# Patient Record
Sex: MALE | Race: BLACK OR AFRICAN AMERICAN | NOT HISPANIC OR LATINO | Employment: FULL TIME | URBAN - METROPOLITAN AREA
[De-identification: names, ages, dates, MRNs, and addresses within clinical notes are randomized per-mention and may not be internally consistent; named-entity substitution may affect disease eponyms.]

---

## 2023-10-02 ENCOUNTER — HOSPITAL ENCOUNTER (EMERGENCY)
Facility: HOSPITAL | Age: 33
Discharge: HOME | End: 2023-10-02
Payer: COMMERCIAL

## 2023-10-02 ENCOUNTER — APPOINTMENT (OUTPATIENT)
Dept: RADIOLOGY | Facility: HOSPITAL | Age: 33
End: 2023-10-02
Payer: COMMERCIAL

## 2023-10-02 VITALS
OXYGEN SATURATION: 99 % | WEIGHT: 286 LBS | RESPIRATION RATE: 20 BRPM | BODY MASS INDEX: 40.04 KG/M2 | SYSTOLIC BLOOD PRESSURE: 172 MMHG | TEMPERATURE: 98.6 F | DIASTOLIC BLOOD PRESSURE: 105 MMHG | HEIGHT: 71 IN | HEART RATE: 64 BPM

## 2023-10-02 DIAGNOSIS — S43.101A AC SEPARATION, RIGHT, INITIAL ENCOUNTER: Primary | ICD-10-CM

## 2023-10-02 PROCEDURE — 73030 X-RAY EXAM OF SHOULDER: CPT | Mod: RT,FY,FR

## 2023-10-02 PROCEDURE — 99283 EMERGENCY DEPT VISIT LOW MDM: CPT

## 2023-10-02 PROCEDURE — 73030 X-RAY EXAM OF SHOULDER: CPT | Mod: RIGHT SIDE | Performed by: RADIOLOGY

## 2023-10-02 PROCEDURE — 2500000001 HC RX 250 WO HCPCS SELF ADMINISTERED DRUGS (ALT 637 FOR MEDICARE OP): Performed by: PHYSICIAN ASSISTANT

## 2023-10-02 RX ORDER — TRAMADOL HYDROCHLORIDE 50 MG/1
50 TABLET ORAL ONCE
Status: COMPLETED | OUTPATIENT
Start: 2023-10-02 | End: 2023-10-02

## 2023-10-02 RX ORDER — TRAMADOL HYDROCHLORIDE 50 MG/1
50 TABLET ORAL EVERY 6 HOURS PRN
Qty: 12 TABLET | Refills: 0 | Status: SHIPPED | OUTPATIENT
Start: 2023-10-02 | End: 2023-10-05

## 2023-10-02 RX ORDER — IBUPROFEN 600 MG/1
600 TABLET ORAL EVERY 6 HOURS PRN
Qty: 28 TABLET | Refills: 0 | Status: SHIPPED | OUTPATIENT
Start: 2023-10-02 | End: 2023-10-09

## 2023-10-02 RX ADMIN — TRAMADOL HYDROCHLORIDE 50 MG: 50 TABLET, COATED ORAL at 23:13

## 2023-10-02 ASSESSMENT — COLUMBIA-SUICIDE SEVERITY RATING SCALE - C-SSRS
6. HAVE YOU EVER DONE ANYTHING, STARTED TO DO ANYTHING, OR PREPARED TO DO ANYTHING TO END YOUR LIFE?: NO
1. IN THE PAST MONTH, HAVE YOU WISHED YOU WERE DEAD OR WISHED YOU COULD GO TO SLEEP AND NOT WAKE UP?: NO
2. HAVE YOU ACTUALLY HAD ANY THOUGHTS OF KILLING YOURSELF?: NO

## 2023-10-02 ASSESSMENT — PAIN DESCRIPTION - LOCATION: LOCATION: SHOULDER

## 2023-10-02 ASSESSMENT — PAIN DESCRIPTION - PAIN TYPE: TYPE: ACUTE PAIN

## 2023-10-02 ASSESSMENT — PAIN - FUNCTIONAL ASSESSMENT: PAIN_FUNCTIONAL_ASSESSMENT: 0-10

## 2023-10-02 ASSESSMENT — PAIN SCALES - GENERAL
PAINLEVEL_OUTOF10: 8
PAINLEVEL_OUTOF10: 10 - WORST POSSIBLE PAIN

## 2023-10-02 ASSESSMENT — PAIN DESCRIPTION - ORIENTATION: ORIENTATION: RIGHT

## 2023-10-03 NOTE — ED PROVIDER NOTES
HPI   Chief Complaint   Patient presents with    Shoulder Pain     Pt arrived from work. Pt was playing kickball, and injured his right shoulder. Pt has 10/10 pain and lowered mobility of the extremity.        33-year-old male presents with complaint of pain in his right shoulder he had a fall that happened and mechanically at work he denies injury elsewhere he feels elevation of his clavicle compared to where he would normally lie.  No chest pain or shortness of breath nothing makes them better or worse has not taken anything for the pain.                          No data recorded                Patient History   No past medical history on file.  No past surgical history on file.  No family history on file.  Social History     Tobacco Use    Smoking status: Not on file    Smokeless tobacco: Not on file   Substance Use Topics    Alcohol use: Not on file    Drug use: Not on file       Physical Exam   ED Triage Vitals [10/02/23 2116]   Temp Heart Rate Resp BP   37 °C (98.6 °F) 64 20 (!) 172/105      SpO2 Temp src Heart Rate Source Patient Position   99 % -- Monitor --      BP Location FiO2 (%)     Right arm --       Physical Exam  Vitals reviewed.   Constitutional:       General: He is not in acute distress.     Appearance: Normal appearance. He is normal weight. He is not ill-appearing, toxic-appearing or diaphoretic.   HENT:      Head: Normocephalic and atraumatic.      Right Ear: External ear normal.      Left Ear: External ear normal.      Nose: Nose normal.   Eyes:      Extraocular Movements: Extraocular movements intact.      Conjunctiva/sclera: Conjunctivae normal.      Pupils: Pupils are equal, round, and reactive to light.   Pulmonary:      Effort: Pulmonary effort is normal. No respiratory distress.      Breath sounds: No stridor.   Abdominal:      General: There is no distension.   Musculoskeletal:         General: No swelling or deformity.      Right shoulder: Tenderness present.        Arms:    Skin:      Capillary Refill: Capillary refill takes less than 2 seconds.      Findings: No rash.   Neurological:      General: No focal deficit present.      Mental Status: He is alert and oriented to person, place, and time. Mental status is at baseline.   Psychiatric:         Mood and Affect: Mood normal.         Behavior: Behavior normal.         Thought Content: Thought content normal.         Judgment: Judgment normal.         ED Course & MDM   Diagnoses as of 10/02/23 8919   AC separation, right, initial encounter       Medical Decision Making  MEDICAL DECISION MAKING   Number and Complexity of Problems  Differential Diagnosis: Fracture, dislocation, sprain.    MDM Data  Shoulder x-ray interpreted by me shows AC separation no signs of fracture dislocation    Treatment and Disposition  ED Course: Ordered sling and analgesia patient to follow-up with orthopedics return for worse concerning symptoms O[facility]RS reviewed tramadol prescribed as well as Motrin.          Procedure  Procedures     Guanako He PA-C  10/02/23 6585

## 2023-10-04 ENCOUNTER — OFFICE VISIT (OUTPATIENT)
Dept: ORTHOPEDIC SURGERY | Facility: CLINIC | Age: 33
End: 2023-10-04
Payer: COMMERCIAL

## 2023-10-04 VITALS — HEIGHT: 71 IN | BODY MASS INDEX: 40.04 KG/M2 | WEIGHT: 286 LBS

## 2023-10-04 DIAGNOSIS — S43.51XA ACROMIOCLAVICULAR SPRAIN, RIGHT, INITIAL ENCOUNTER: Primary | ICD-10-CM

## 2023-10-04 PROCEDURE — 99203 OFFICE O/P NEW LOW 30 MIN: CPT | Performed by: FAMILY MEDICINE

## 2023-10-04 PROCEDURE — 1036F TOBACCO NON-USER: CPT | Performed by: FAMILY MEDICINE

## 2023-10-04 NOTE — LETTER
October 4, 2023     Patient: Johnson Garner   YOB: 1990   Date of Visit: 10/4/2023       To Whom It May Concern:    It is my medical opinion that Johnson Garner should remain out of work until 10/27/23 .    If you have any questions or concerns, please don't hesitate to call.         Sincerely,        Alessio Foster MD    CC: No Recipients

## 2023-10-04 NOTE — PROGRESS NOTES
History of Present Illness   Chief Complaint   Patient presents with    Right Shoulder - Pain    Health system DOI: 10/2/23       The patient is 33 y.o. right-hand-dominant male presenting for evaluation of right shoulder injury.  This is a work-related injury, injury occurred 2 days ago, fell while playing kickball at work.  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  Fell landing on right shoulder with acute onset of pain, limited range of motion.  He was seen in the emergency department following injury, x-rays obtained which did reveal a grade 3 AC sprain, he is placed in a sling and recommended outpatient orthopedic follow-up.  Has been taking Motrin for pain control, minimal change in symptoms over the past 2 days.  Still with pretty limited range of motion.  Pain with attempts at moving his shoulder, difficulty sleeping at night secondary to pain.  He denies any radiation of pain down the upper extremity, no numbness or tingling.  He has noticed a more notable prominence of the distal clavicle not present previous to this.    No past medical history on file.    Medication Documentation Review Audit    **Prior to Admission medications have not yet been reviewed**         No Known Allergies    Social History     Socioeconomic History    Marital status:      Spouse name: Not on file    Number of children: Not on file    Years of education: Not on file    Highest education level: Not on file   Occupational History    Not on file   Tobacco Use    Smoking status: Not on file    Smokeless tobacco: Not on file   Substance and Sexual Activity    Alcohol use: Not on file    Drug use: Not on file    Sexual activity: Not on file   Other Topics Concern    Not on file   Social History Narrative    Not on file     Social Determinants of Health     Financial Resource Strain: Not on file   Food Insecurity: Not on file   Transportation Needs: Not on file   Physical Activity: Not on file   Stress: Not on  file   Social Connections: Not on file   Intimate Partner Violence: Not on file   Housing Stability: Not on file       No past surgical history on file.       Review of Systems   GENERAL: Negative  GI: Negative  MUSCULOSKELETAL: See HPI  SKIN: Negative  NEURO:  Negative     Physical Exam:    General/Constitutional: well appearing, no distress, appears stated age  HEENT: sclera clear  Respiratory: non labored breathing  Vascular: No edema, swelling or tenderness, except as noted in detailed exam.  Integumentary: No impressive skin lesions present, except as noted in detailed exam.  Neurological:  Alert and oriented   Psychological:  Normal mood and affect.  Musculoskeletal: Normal, except as noted in detailed exam and in HPI    Right shoulder: There is prominence of the distal clavicle in relation to the acromion, otherwise normal appearance.  He is tender palpation at the AC joint and distal clavicle, there is a positive piano key sign at the AC joint.  He has limited range of motion of the right shoulder most notable with forward flexion limited to around 60 degrees, abduction is significantly better to 150 degrees with some pain, internal rotation to lower thoracic spine, external rotation 70 degrees.  He has no weakness or pain with resisted internal or external rotation.  There is pain and mild weakness, 3+ out of 5 with resisted abduction.  Positive crossarm adduction, positive [place].  He has discomfort with Monteiro and Neer's testing       Imaging  X-rays of right shoulder from 10-23 independently reviewed, there is elevation of the distal clavicle in relation to the acromion consistent with grade 3 AC sprain, no fracture is identified     Assessment   1. Acromioclavicular sprain, right, initial encounter  Referral to Physical Therapy            Plan  Discussed diagnosis, reviewed x-rays, treatment options with patient.  Stated that grade 3 injuries can typically be treated nonoperatively, we did discuss  that he would likely have permanent elevation of the distal clavicle which should be more cosmetic in nature.  I usually recommend physical therapy for this type of injury, we will submit C9 request to his work.  He can use sling for comfort over the next 1 to 2 weeks, did encourage him to come out of the sling to work on some gentle range of motion exercises of the shoulder.  He can use over-the-counter medications as needed for pain.  We will plan to keep him out of work for the next 3 weeks.  He will follow-up in 2 weeks for reassessment

## 2023-10-16 PROBLEM — S72.322A: Status: ACTIVE | Noted: 2022-11-01

## 2023-10-16 PROBLEM — M79.9 POSTURAL STRAIN: Status: ACTIVE | Noted: 2023-10-16

## 2023-10-16 PROBLEM — M62.838 MUSCLE SPASM: Status: ACTIVE | Noted: 2023-10-16

## 2023-10-16 PROBLEM — S72.045A: Status: ACTIVE | Noted: 2022-11-02

## 2023-10-16 PROBLEM — M54.17 LUMBOSACRAL RADICULITIS: Status: ACTIVE | Noted: 2023-10-16

## 2023-10-16 PROBLEM — M99.09 SEGMENTAL AND SOMATIC DYSFUNCTION: Status: ACTIVE | Noted: 2023-10-16

## 2023-10-16 RX ORDER — GUAIFENESIN 600 MG/1
600 TABLET, EXTENDED RELEASE ORAL 2 TIMES DAILY
COMMUNITY
Start: 2016-04-06

## 2023-10-16 RX ORDER — POTASSIUM CHLORIDE 20 MEQ/1
20 TABLET, EXTENDED RELEASE ORAL
COMMUNITY
Start: 2012-07-03

## 2023-10-16 RX ORDER — AMOXICILLIN AND CLAVULANATE POTASSIUM 875; 125 MG/1; MG/1
TABLET, FILM COATED ORAL 2 TIMES DAILY
COMMUNITY
Start: 2019-08-28

## 2023-10-16 RX ORDER — ONDANSETRON 4 MG/1
4 TABLET, ORALLY DISINTEGRATING ORAL EVERY 4 HOURS PRN
COMMUNITY
Start: 2016-04-06

## 2023-10-16 RX ORDER — ENOXAPARIN SODIUM 100 MG/ML
30 INJECTION SUBCUTANEOUS
COMMUNITY
Start: 2022-11-03

## 2023-10-16 RX ORDER — HYDROCODONE BITARTRATE AND ACETAMINOPHEN 5; 325 MG/1; MG/1
TABLET ORAL EVERY 6 HOURS PRN
COMMUNITY
Start: 2013-12-02

## 2023-10-18 ENCOUNTER — OFFICE VISIT (OUTPATIENT)
Dept: ORTHOPEDIC SURGERY | Facility: CLINIC | Age: 33
End: 2023-10-18
Payer: COMMERCIAL

## 2023-10-18 DIAGNOSIS — S43.51XA ACROMIOCLAVICULAR SPRAIN, RIGHT, INITIAL ENCOUNTER: Primary | ICD-10-CM

## 2023-10-18 PROCEDURE — 99213 OFFICE O/P EST LOW 20 MIN: CPT | Performed by: FAMILY MEDICINE

## 2023-10-18 NOTE — PROGRESS NOTES
History of Present Illness   Chief Complaint   Patient presents with    OTHER     F/U RT SHOULDER   [facility]    DOI: 10/2/23    The patient is 33 y.o. right-hand-dominant male presenting for follow-up of work-related right shoulder injury.  Initial visit with me 2 weeks ago, injury occurred 2 days prior to this after a fall on right shoulder.  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  X-rays from ER consistent with grade C AC joint sprain.  At our initial visit we discussed that majority of grade 3 AC sprains are treated nonoperatively with biggest concern is cosmetic deformity.  We did submit a C9 request for PT, recommended that he continue with sling for comfort but could do some gentle range of motion exercises out of the sling.  He has been out of work.  Overall he has seen good improvement in symptoms with regards to pain and range of motion, he is still somewhat limited with forward flexion but admits to good improvement with less pain.  He is no longer wearing sling for comfort, he has been doing some range of motion as well as some band exercises.  He has not heard anything back with regards to his physical therapy C9 request.  He is somewhat concerned about appearance of his shoulder and is potentially interested in surgical opinion on regards to this.      No past medical history on file.    Medication Documentation Review Audit       Reviewed by Luisa Solano MA (Medical Assistant) on 10/18/23 at 0919      Medication Order Taking? Sig Documenting Provider Last Dose Status   amoxicillin-pot clavulanate (Augmentin) 875-125 mg tablet [id number]  Take by mouth twice a day. Historical Provider, MD  Active   enoxaparin (Lovenox) 30 mg/0.3 mL syringe [id number]  Inject 0.3 mL (30 mg) under the skin. Historical Provider, MD  Active   guaiFENesin (Mucinex) 600 mg 12 hr tablet [id number]  Take 1 tablet (600 mg) by mouth twice a day. Historical Provider, MD  Active    HYDROcodone-acetaminophen (Norco) 5-325 mg tablet [id number]  Take by mouth every 6 hours if needed. Historical Provider, MD  Active   ondansetron ODT (Zofran-ODT) 4 mg disintegrating tablet [id number]  Take 1 tablet (4 mg) by mouth every 4 hours if needed. Historical Provider, MD  Active   potassium chloride CR 20 mEq ER tablet [id number]  Take 1 tablet (20 mEq) by mouth once daily. Historical Provider, MD  Active                    No Known Allergies    Social History     Socioeconomic History    Marital status:      Spouse name: Not on file    Number of children: Not on file    Years of education: Not on file    Highest education level: Not on file   Occupational History    Not on file   Tobacco Use    Smoking status: Never    Smokeless tobacco: Never   Substance and Sexual Activity    Alcohol use: Not on file    Drug use: Not on file    Sexual activity: Not on file   Other Topics Concern    Not on file   Social History Narrative    Not on file     Social Determinants of Health     Financial Resource Strain: Not on file   Food Insecurity: Not on file   Transportation Needs: Not on file   Physical Activity: Not on file   Stress: Not on file   Social Connections: Not on file   Intimate Partner Violence: Not on file   Housing Stability: Not on file       No past surgical history on file.       Review of Systems   GENERAL: Negative  GI: Negative  MUSCULOSKELETAL: See HPI  SKIN: Negative  NEURO:  Negative     Physical Exam:    General/Constitutional: well appearing, no distress, appears stated age  HEENT: sclera clear  Respiratory: non labored breathing  Vascular: No edema, swelling or tenderness, except as noted in detailed exam.  Integumentary: No impressive skin lesions present, except as noted in detailed exam.  Neurological:  Alert and oriented   Psychological:  Normal mood and affect.  Musculoskeletal: Normal, except as noted in detailed exam and in HPI    Right shoulder: There is prominence of the distal  clavicle in relation to the acromion, otherwise normal appearance.  Minimal residual tenderness at the AC joint.  He has improved range of motion, forward flexion improved to 130 degrees from 60, abduction 150 degrees with pain at endrange.  He has no weakness or pain with resisted internal or external rotation.  There is pain and mild weakness, 4+ out of 5 with resisted abduction.  Positive crossarm adduction, positive [place]ity.  He has discomfort with Monteiro and Neer's testing       Imaging  No new imaging today    Assessment   1. Acromioclavicular sprain, right, initial encounter            2 weeks out from injury, good clinical improvement, still with notable cosmetic deformity of the AC joint    Plan  Discussed further work-up and treatment.  Patient is doing well clinically, notable improvement in range of motion, no significant motor deficits are present.  He is concerned about appearance of his shoulder.  He was inquiring about possible surgical opinion with regards to this.  I stated that I could place a C9 request for a consult to my shoulder surgeon colleague Dr. Guardado to discuss risks and benefits of potential surgical intervention for his grade 3 AC joint sprain.  In the meantime he will continue with home exercise program, he will reach out to his work with regards to status of his C9 for his formal PT.  He is scheduled to return to work in around 10 days, he feels like based on how he is progressing he is comfortable with plans to return to work at that time.  Potential next steps would be follow-up with Dr. Guardado, held off on scheduling any follow-up visits with me today